# Patient Record
Sex: MALE | Employment: OTHER | URBAN - METROPOLITAN AREA
[De-identification: names, ages, dates, MRNs, and addresses within clinical notes are randomized per-mention and may not be internally consistent; named-entity substitution may affect disease eponyms.]

---

## 2023-12-30 DIAGNOSIS — G25.0 ESSENTIAL TREMOR: ICD-10-CM

## 2024-01-02 RX ORDER — TOPIRAMATE 100 MG/1
100 TABLET, FILM COATED ORAL 2 TIMES DAILY
Qty: 180 TABLET | Refills: 3 | Status: SHIPPED | OUTPATIENT
Start: 2024-01-02

## 2025-01-27 DIAGNOSIS — G25.0 ESSENTIAL TREMOR: ICD-10-CM

## 2025-01-27 RX ORDER — TOPIRAMATE 100 MG/1
100 TABLET, FILM COATED ORAL 2 TIMES DAILY
Qty: 180 TABLET | Refills: 3 | OUTPATIENT
Start: 2025-01-27

## 2025-04-15 DIAGNOSIS — G25.0 ESSENTIAL TREMOR: ICD-10-CM

## 2025-04-15 RX ORDER — TOPIRAMATE 100 MG/1
100 TABLET, FILM COATED ORAL 2 TIMES DAILY
Qty: 180 TABLET | Refills: 0 | Status: SHIPPED | OUTPATIENT
Start: 2025-04-15

## 2025-06-09 ENCOUNTER — APPOINTMENT (OUTPATIENT)
Dept: NEUROLOGY | Facility: CLINIC | Age: 75
End: 2025-06-09
Payer: MEDICARE

## 2025-06-09 VITALS
SYSTOLIC BLOOD PRESSURE: 132 MMHG | HEART RATE: 69 BPM | TEMPERATURE: 98.2 F | DIASTOLIC BLOOD PRESSURE: 84 MMHG | WEIGHT: 254 LBS | BODY MASS INDEX: 33.66 KG/M2 | RESPIRATION RATE: 16 BRPM | HEIGHT: 73 IN

## 2025-06-09 DIAGNOSIS — M54.41 CHRONIC BILATERAL LOW BACK PAIN WITH RIGHT-SIDED SCIATICA: ICD-10-CM

## 2025-06-09 DIAGNOSIS — G20.C PARKINSONISM, UNSPECIFIED PARKINSONISM TYPE (MULTI): ICD-10-CM

## 2025-06-09 DIAGNOSIS — M48.062 SPINAL STENOSIS OF LUMBAR REGION WITH NEUROGENIC CLAUDICATION: ICD-10-CM

## 2025-06-09 DIAGNOSIS — G89.29 CHRONIC BILATERAL LOW BACK PAIN WITH RIGHT-SIDED SCIATICA: ICD-10-CM

## 2025-06-09 DIAGNOSIS — F51.01 PRIMARY INSOMNIA: ICD-10-CM

## 2025-06-09 DIAGNOSIS — G25.0 ESSENTIAL TREMOR: ICD-10-CM

## 2025-06-09 DIAGNOSIS — G25.0 BENIGN ESSENTIAL TREMOR: Primary | ICD-10-CM

## 2025-06-09 PROCEDURE — 1036F TOBACCO NON-USER: CPT | Performed by: PSYCHIATRY & NEUROLOGY

## 2025-06-09 PROCEDURE — G8433 SCR FOR DEP NOT CPT DOC RSN: HCPCS | Performed by: PSYCHIATRY & NEUROLOGY

## 2025-06-09 PROCEDURE — 1160F RVW MEDS BY RX/DR IN RCRD: CPT | Performed by: PSYCHIATRY & NEUROLOGY

## 2025-06-09 PROCEDURE — 1159F MED LIST DOCD IN RCRD: CPT | Performed by: PSYCHIATRY & NEUROLOGY

## 2025-06-09 PROCEDURE — G2211 COMPLEX E/M VISIT ADD ON: HCPCS | Performed by: PSYCHIATRY & NEUROLOGY

## 2025-06-09 PROCEDURE — 99215 OFFICE O/P EST HI 40 MIN: CPT | Performed by: PSYCHIATRY & NEUROLOGY

## 2025-06-09 PROCEDURE — 3008F BODY MASS INDEX DOCD: CPT | Performed by: PSYCHIATRY & NEUROLOGY

## 2025-06-09 RX ORDER — CARBIDOPA AND LEVODOPA 25; 100 MG/1; MG/1
1 TABLET ORAL 3 TIMES DAILY
Qty: 90 TABLET | Refills: 2 | Status: SHIPPED | OUTPATIENT
Start: 2025-06-09 | End: 2026-06-09

## 2025-06-09 RX ORDER — TIZANIDINE 4 MG/1
4 TABLET ORAL EVERY 8 HOURS PRN
COMMUNITY
Start: 2024-05-06

## 2025-06-09 RX ORDER — DULOXETIN HYDROCHLORIDE 60 MG/1
60 CAPSULE, DELAYED RELEASE ORAL DAILY
COMMUNITY
Start: 2024-06-26

## 2025-06-09 RX ORDER — FLUTICASONE PROPIONATE 50 MCG
2 SPRAY, SUSPENSION (ML) NASAL DAILY
COMMUNITY

## 2025-06-09 RX ORDER — PANTOPRAZOLE SODIUM 40 MG/1
20 TABLET, DELAYED RELEASE ORAL EVERY 12 HOURS
COMMUNITY

## 2025-06-09 RX ORDER — NITROGLYCERIN 0.4 MG/1
0.4 TABLET SUBLINGUAL EVERY 5 MIN PRN
COMMUNITY
Start: 2016-09-19

## 2025-06-09 RX ORDER — MULTIVITAMIN
1 TABLET ORAL DAILY
COMMUNITY

## 2025-06-09 RX ORDER — PYRIDOXINE HCL (VITAMIN B6) 100 MG
1 TABLET ORAL DAILY
COMMUNITY

## 2025-06-09 RX ORDER — ESZOPICLONE 3 MG/1
3 TABLET, FILM COATED ORAL NIGHTLY
COMMUNITY

## 2025-06-09 RX ORDER — HYDROCODONE BITARTRATE AND ACETAMINOPHEN 5; 325 MG/1; MG/1
0.5 TABLET ORAL EVERY 6 HOURS PRN
COMMUNITY

## 2025-06-09 RX ORDER — CETIRIZINE HYDROCHLORIDE 10 MG/1
10 TABLET ORAL DAILY
COMMUNITY
Start: 2016-09-19

## 2025-06-09 RX ORDER — BUPRENORPHINE 15 UG/H
1 PATCH TRANSDERMAL
COMMUNITY

## 2025-06-09 RX ORDER — CYANOCOBALAMIN (VITAMIN B-12) 250 MCG
1 TABLET ORAL DAILY
COMMUNITY

## 2025-06-09 RX ORDER — ALBUTEROL SULFATE 90 UG/1
2 INHALANT RESPIRATORY (INHALATION) EVERY 6 HOURS PRN
COMMUNITY

## 2025-06-09 RX ORDER — LANOLIN ALCOHOL/MO/W.PET/CERES
100 CREAM (GRAM) TOPICAL
COMMUNITY

## 2025-06-09 RX ORDER — FERROUS SULFATE 325(65) MG
325 TABLET ORAL
COMMUNITY

## 2025-06-09 RX ORDER — TOPIRAMATE 100 MG/1
100 TABLET, FILM COATED ORAL 2 TIMES DAILY
Qty: 180 TABLET | Refills: 3 | Status: SHIPPED | OUTPATIENT
Start: 2025-06-09

## 2025-06-09 RX ORDER — LEVOTHYROXINE SODIUM 200 UG/1
200 TABLET ORAL DAILY
COMMUNITY

## 2025-06-09 RX ORDER — MYCOPHENOLATE MOFETIL 500 MG/1
500 TABLET ORAL DAILY
COMMUNITY
Start: 2025-04-30

## 2025-06-09 RX ORDER — TAMSULOSIN HYDROCHLORIDE 0.4 MG/1
0.8 CAPSULE ORAL NIGHTLY
COMMUNITY
Start: 2024-08-26

## 2025-06-09 RX ORDER — FUROSEMIDE 40 MG/1
40 TABLET ORAL
COMMUNITY
Start: 2025-05-16

## 2025-06-09 RX ORDER — FINASTERIDE 5 MG/1
5 TABLET, FILM COATED ORAL DAILY
COMMUNITY

## 2025-06-09 RX ORDER — ONDANSETRON 4 MG/1
4 TABLET, FILM COATED ORAL
COMMUNITY
Start: 2016-09-19

## 2025-06-09 ASSESSMENT — ENCOUNTER SYMPTOMS
DEPRESSION: 0
TREMORS: 1
OCCASIONAL FEELINGS OF UNSTEADINESS: 1
LOSS OF SENSATION IN FEET: 0

## 2025-06-09 NOTE — PROGRESS NOTES
Subjective     Emil Ross 74 y.o.  HPI  The patient was seen by me back in 2022 for tremor.  At that time his tremor was relatively controlled and he was compliant with his Topamax at 100 mg twice a day.  The patient states that his tremors have worsened since then and now he has a tremor at rest.  He states that it is worse when he moves his hands and he is eating or using a utensil.  He states that now he is unable to stop the tremor whereas he could stop it before.  The patient feels that he is having tremors of other parts of his body such as his shoulders and jaw.  He feels that he is slow down but he does not feel that he shuffles when he walks.  He feels his voice is changed but has not necessarily gotten softer.  The patient states that he paints as a hobby and that he has been having difficulty doing this with the tremor.  The patient states that he did try primidone but felt it made him sleepy as he was working at the time.  He is compliant with his Topamax at 100 mg twice a day.  The patient states that he paints as a hobby    The patient is still complaining of back pain and has radiation of his pain down his right lower extremity.  He feels that when he walks his pain is worse.  The patient is using a walker to ambulate and has had no falls.      Review of Systems   Neurological:  Positive for tremors.        Problem List[1]     Medical History[2]     Surgical History[3]     Social History     Socioeconomic History    Marital status:      Spouse name: Not on file    Number of children: Not on file    Years of education: Not on file    Highest education level: Not on file   Occupational History    Not on file   Tobacco Use    Smoking status: Former     Types: Cigarettes     Passive exposure: Past    Smokeless tobacco: Never   Vaping Use    Vaping status: Never Used   Substance and Sexual Activity    Alcohol use: Not Currently    Drug use: Never    Sexual activity: Not on file   Other Topics  Concern    Not on file   Social History Narrative    Not on file     Social Drivers of Health     Financial Resource Strain: Low Risk  (2/24/2023)    [facility]    Overall Financial Resource Strain (CARDIA)     Difficulty of Paying Living Expenses: Not hard at all   Food Insecurity: No Food Insecurity (12/31/2024)    Received from [facility]    Hunger Vital Sign     Worried About Running Out of Food in the Last Year: Never true     Ran Out of Food in the Last Year: Never true   Transportation Needs: No Transportation Needs (12/31/2024)    Received from [facility]    PRAPARE - Transportation     Lack of Transportation (Medical): No     Lack of Transportation (Non-Medical): No   Physical Activity: Inactive (2/24/2023)    [facility]    Exercise Vital Sign     Days of Exercise per Week: 0 days     Minutes of Exercise per Session: 0 min   Stress: No Stress Concern Present (2/24/2023)    Received [facility]    [employer] [place] [employer] - Occupational Stress Questionnaire     Feeling of Stress : Not at all   Social Connections: Moderately Integrated (2/24/2023)    Received from [facility]    Social Connection and Isolation Panel [NHANES]     Frequency of Communication with Friends and Family: More than three times a week     Frequency of Social Gatherings with Friends and Family: Three times a week     Attends Alev[employer] Services: More than 4 times per year     Active Member of Clubs or Organizations: No     Attends Club or Organization Meetings: Never     Marital Status:    Intimate Partner Violence: Not At Risk (2/24/2023)    Received [facility]    Humiliation, Afraid, Rape, and Kick questionnaire     Fear of Current or Ex-Partner: No     Emotionally Abused: No     Physically Abused: No     Sexually Abused: No   Housing Stability: Unknown (12/31/2024)    Received from [facility]    Housing Stability Vital Sign     Unable to Pay for  "Housing in the Last Year: No     Number of Times Moved in the Last Year: Not on file     Homeless in the Last Year: No        Family History[4]     Current Outpatient Medications   Medication Instructions    albuterol 90 mcg/actuation inhaler 2 puffs, Every 6 hours PRN    buprenorphine (Butrans) 15 mcg/hour 1 patch, Once Weekly    calcium carbonate-vitamin D3 600 mg-10 mcg (400 unit) tablet 1 tablet, Daily    cetirizine (ZYRTEC) 10 mg, Daily    cyanocobalamin (Vitamin B-12) 250 mcg tablet 1 tablet, Daily    DULoxetine (CYMBALTA) 60 mg, Daily    eszopiclone (LUNESTA) 3 mg, Nightly    ferrous sulfate 325 mg, Daily RT    finasteride (PROSCAR) 5 mg, Daily    fluticasone (Flonase) 50 mcg/actuation nasal spray 2 sprays, Daily    furosemide (LASIX) 40 mg, Daily before breakfast    HYDROcodone-acetaminophen (Norco) 5-325 mg tablet 0.5 tablets, Every 6 hours PRN    levothyroxine (SYNTHROID, LEVOXYL) 200 mcg, Daily    mycophenolate (CELLCEPT) 500 mg, Daily    nitroglycerin (NITROSTAT) 0.4 mg, Every 5 min PRN    ondansetron (ZOFRAN) 4 mg    pantoprazole (PROTONIX) 20 mg, Every 12 hours    pyridoxine (Vitamin B-6) 100 mg tablet 1 tablet, Daily    tamsulosin (FLOMAX) 0.8 mg, Nightly    thiamine (VITAMIN B-1) 100 mg, Daily RT    tiotropium (Spiriva Respimat) 2.5 mcg/actuation inhaler 2 puffs, Daily    tiZANidine (ZANAFLEX) 4 mg, Every 8 hours PRN    topiramate (TOPAMAX) 100 mg, oral, 2 times daily        RX Allergies[5]     Objective  /84 (BP Location: Left arm)   Pulse 69   Temp 36.8 °C (98.2 °F)   Resp 16   Ht 1.854 m (6' 1\")   Wt 115 kg (254 lb)   BMI 33.51 kg/m²    GENERAL APPEARANCE:  No distress, alert and cooperative.     MENTAL STATE:  Orientation was normal to time, place and person. Recent and remote memory was intact.  Attention span and concentration were normal. Language testing was normal for comprehension, repetition, expression, and naming. Calculation was intact. The patient could correctly interpret " a picture, and copy a diagram. General fund of knowledge was intact.     CRANIAL NERVES:  Cranial nerves were normal.      CN 2- Visual Acuity  OD: 20/20 (corrected)   OS: 20/20 (corrected); visual fields full to confrontation.      CN 3, 4, 6-  Pupils round, 4 mm in diameter, equally reactive to light. No ptosis. EOMs normal alignment, full range of movement, no nystagmus     CN 5- Facial sensation intact bilaterally. Normal corneal reflexes.      CN 7- Normal and symmetric facial strength. Nasolabial folds symmetric.     CN 8- Hearing intact to finger rub, whisper.      CN 9- Palate elevates symmetrically. Normal gag reflex.      CN 11- Normal strength of shoulder shrug and neck turning      CN 12- Tongue midline, with normal bulk and strength; no fasciculations.     MOTOR:  Muscle bulk and tone were normal in both upper and lower extremities. Muscle strength was 5/5 in distal and proximal muscles in both upper and lower extremities with the exception of the patient's dorsiflexion strength is 5-/5 bilaterally.  The patient has a mild tremor of his upper extremities at rest that is worse with movement.    GAIT: The patient's station and gait show that he is mildly hunched over but there is no shuffling.  The patient has a mildly unsteady gait.    Assessment/Plan   The patient feels that his tremor is worse.  I believe that there is some component of parkinsonism in his tremor but he does not have any other parkinsonian features.  I will continue his Topamax at 100 mg p.o. twice daily.  I did review his Topamax prescription for 1 year.  I will start him on Sinemet at 25/100 #1 p.o. 3 times daily.  I did warn him of the possibility of nausea, vomiting, dizziness, orthostatic hypotension, abdominal pain and sedation with this medicine.  I would like him to call me after he has been on this medicine for 2 weeks to let me know how he is doing.  If the Sinemet does not help after 2 weeks, I will increase his Topamax and  consider a trial of primidone to 50 mg a day.  The patient should continue all of his medicines and stroke risk factor modification.  The patient can use hydrocodone as needed for back pain.  The patient is considering having a spinal cord stimulator placed.  The patient should continue use his walker to improve his safety margin.  I discussed all these issues in detail with the patient and answered all of his questions.  The patient will follow-up with me in 1 year.         [1] There is no problem list on file for this patient.  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    Pemphigus vulgaris    Personal history of other diseases of the circulatory system     History of hypertension    Personal history of other diseases of the circulatory system     History of irregular heartbeat    Personal history of other diseases of the nervous system and sense organs     History of benign essential tremor    Personal history of other diseases of the nervous system and sense organs     History of sleep apnea    Personal history of other diseases of the respiratory system     History of asthma    Personal history of other diseases of the respiratory system     History of chronic obstructive lung disease    Personal history of other endocrine, nutritional and metabolic disease     History of hypothyroidism    Personal history of other endocrine, nutritional and metabolic disease     History of diabetes mellitus    Personal history of other specified conditions     History of insomnia   [3]   Past Surgical History:  Procedure Laterality Date    BACK SURGERY  09/19/2016    Back Surgery    BELT ABDOMINOPLASTY  09/19/2016    Abdominoplasty    HERNIA REPAIR  09/19/2016    Hernia Repair    NOSE SURGERY  09/19/2016    Nose Surgery    OTHER SURGICAL HISTORY  07/13/2021    Excision of basal cell carcinoma    OTHER SURGICAL HISTORY   07/13/2021    Lumbar laminectomy    OTHER SURGICAL HISTORY  01/12/2018    Gastric Bypass With Tere-en-Y Short Limb    OTHER SURGICAL HISTORY  01/12/2018    Excision Of Lesion Maxillary Sinus W/ [place]-Fan Approach    OTHER SURGICAL HISTORY  01/12/2018    Ethmoidectomy    SEPTOPLASTY  01/12/2018    Septoplasty   [4] No family history on file.  [5] No Known Allergies

## 2025-06-09 NOTE — PATIENT INSTRUCTIONS
The patient feels that his tremor is worse.  I believe that there is some component of parkinsonism in his tremor but he does not have any other parkinsonian features.  I will continue his Topamax at 100 mg p.o. twice daily.  I did review his Topamax prescription for 1 year.  I will start him on Sinemet at 25/100 #1 p.o. 3 times daily.  I did warn him of the possibility of nausea, vomiting, dizziness, orthostatic hypotension, abdominal pain and sedation with this medicine.  I would like him to call me after he has been on this medicine for 2 weeks to let me know how he is doing.  If the Sinemet does not help after 2 weeks, I will increase his Topamax and consider a trial of primidone to 50 mg a day.  The patient should continue all of his medicines and stroke risk factor modification.  The patient can use hydrocodone as needed for back pain.  The patient is considering having a spinal cord stimulator placed.  The patient should continue use his walker to improve his safety margin.  I discussed all these issues in detail with the patient and answered all of his questions.  The patient will follow-up with me in 1 year.

## 2025-07-11 ENCOUNTER — TELEPHONE (OUTPATIENT)
Dept: NEUROLOGY | Facility: CLINIC | Age: 75
End: 2025-07-11
Payer: MEDICARE

## 2025-07-15 DIAGNOSIS — G25.0 BENIGN ESSENTIAL TREMOR: Primary | ICD-10-CM

## 2025-07-15 RX ORDER — PRIMIDONE 50 MG/1
TABLET ORAL
Qty: 53 TABLET | Refills: 0 | Status: SHIPPED | OUTPATIENT
Start: 2025-07-15 | End: 2025-08-12

## 2025-08-18 DIAGNOSIS — G25.0 BENIGN ESSENTIAL TREMOR: ICD-10-CM

## 2025-08-18 RX ORDER — PRIMIDONE 50 MG/1
100 TABLET ORAL NIGHTLY
Qty: 60 TABLET | Refills: 11 | Status: SHIPPED | OUTPATIENT
Start: 2025-08-18 | End: 2026-08-18

## 2025-09-02 ENCOUNTER — TELEPHONE (OUTPATIENT)
Dept: NEUROLOGY | Facility: CLINIC | Age: 75
End: 2025-09-02
Payer: MEDICARE

## 2025-09-04 DIAGNOSIS — G25.0 BENIGN ESSENTIAL TREMOR: Primary | ICD-10-CM

## 2025-09-05 RX ORDER — CARBIDOPA AND LEVODOPA 25; 100 MG/1; MG/1
1 TABLET ORAL 3 TIMES DAILY
Qty: 90 TABLET | Refills: 0 | Status: SHIPPED | OUTPATIENT
Start: 2025-09-05 | End: 2025-10-05